# Patient Record
Sex: MALE | Race: WHITE | NOT HISPANIC OR LATINO | Employment: FULL TIME | URBAN - METROPOLITAN AREA
[De-identification: names, ages, dates, MRNs, and addresses within clinical notes are randomized per-mention and may not be internally consistent; named-entity substitution may affect disease eponyms.]

---

## 2024-03-11 DIAGNOSIS — Z11.59 NEED FOR HEPATITIS C SCREENING TEST: ICD-10-CM

## 2024-03-11 DIAGNOSIS — Z11.4 ENCOUNTER FOR SCREENING FOR HIV: ICD-10-CM

## 2024-03-11 DIAGNOSIS — Z00.00 ANNUAL PHYSICAL EXAM: ICD-10-CM

## 2024-03-11 DIAGNOSIS — Z12.5 PROSTATE CANCER SCREENING: ICD-10-CM

## 2024-03-15 ENCOUNTER — LAB (OUTPATIENT)
Dept: LAB | Facility: LAB | Age: 44
End: 2024-03-15
Payer: COMMERCIAL

## 2024-03-15 DIAGNOSIS — Z00.00 ANNUAL PHYSICAL EXAM: ICD-10-CM

## 2024-03-15 DIAGNOSIS — Z12.5 PROSTATE CANCER SCREENING: ICD-10-CM

## 2024-03-15 DIAGNOSIS — Z11.4 ENCOUNTER FOR SCREENING FOR HIV: ICD-10-CM

## 2024-03-15 DIAGNOSIS — Z11.59 NEED FOR HEPATITIS C SCREENING TEST: ICD-10-CM

## 2024-03-15 LAB
ALBUMIN SERPL BCP-MCNC: 4.2 G/DL (ref 3.4–5)
ALP SERPL-CCNC: 63 U/L (ref 33–120)
ALT SERPL W P-5'-P-CCNC: 38 U/L (ref 10–52)
ANION GAP SERPL CALC-SCNC: 11 MMOL/L (ref 10–20)
AST SERPL W P-5'-P-CCNC: 23 U/L (ref 9–39)
BASOPHILS # BLD AUTO: 0.04 X10*3/UL (ref 0–0.1)
BASOPHILS NFR BLD AUTO: 0.7 %
BILIRUB SERPL-MCNC: 0.7 MG/DL (ref 0–1.2)
BUN SERPL-MCNC: 13 MG/DL (ref 6–23)
CALCIUM SERPL-MCNC: 9.3 MG/DL (ref 8.6–10.3)
CHLORIDE SERPL-SCNC: 105 MMOL/L (ref 98–107)
CHOLEST SERPL-MCNC: 185 MG/DL (ref 0–199)
CHOLESTEROL/HDL RATIO: 5.2
CO2 SERPL-SCNC: 28 MMOL/L (ref 21–32)
CREAT SERPL-MCNC: 0.94 MG/DL (ref 0.5–1.3)
EGFRCR SERPLBLD CKD-EPI 2021: >90 ML/MIN/1.73M*2
EOSINOPHIL # BLD AUTO: 0.1 X10*3/UL (ref 0–0.7)
EOSINOPHIL NFR BLD AUTO: 1.6 %
ERYTHROCYTE [DISTWIDTH] IN BLOOD BY AUTOMATED COUNT: 13.1 % (ref 11.5–14.5)
GLUCOSE SERPL-MCNC: 82 MG/DL (ref 74–99)
HCT VFR BLD AUTO: 48 % (ref 41–52)
HCV AB SER QL: NONREACTIVE
HDLC SERPL-MCNC: 35.7 MG/DL
HGB BLD-MCNC: 15.1 G/DL (ref 13.5–17.5)
HIV 1+2 AB+HIV1 P24 AG SERPL QL IA: NONREACTIVE
IMM GRANULOCYTES # BLD AUTO: 0.02 X10*3/UL (ref 0–0.7)
IMM GRANULOCYTES NFR BLD AUTO: 0.3 % (ref 0–0.9)
LDLC SERPL CALC-MCNC: 121 MG/DL
LYMPHOCYTES # BLD AUTO: 1.92 X10*3/UL (ref 1.2–4.8)
LYMPHOCYTES NFR BLD AUTO: 31.5 %
MCH RBC QN AUTO: 27.5 PG (ref 26–34)
MCHC RBC AUTO-ENTMCNC: 31.5 G/DL (ref 32–36)
MCV RBC AUTO: 87 FL (ref 80–100)
MONOCYTES # BLD AUTO: 0.52 X10*3/UL (ref 0.1–1)
MONOCYTES NFR BLD AUTO: 8.5 %
NEUTROPHILS # BLD AUTO: 3.49 X10*3/UL (ref 1.2–7.7)
NEUTROPHILS NFR BLD AUTO: 57.4 %
NON HDL CHOLESTEROL: 149 MG/DL (ref 0–149)
NRBC BLD-RTO: 0 /100 WBCS (ref 0–0)
PLATELET # BLD AUTO: 285 X10*3/UL (ref 150–450)
POTASSIUM SERPL-SCNC: 3.8 MMOL/L (ref 3.5–5.3)
PROT SERPL-MCNC: 7 G/DL (ref 6.4–8.2)
PSA SERPL-MCNC: 0.86 NG/ML
RBC # BLD AUTO: 5.49 X10*6/UL (ref 4.5–5.9)
SODIUM SERPL-SCNC: 140 MMOL/L (ref 136–145)
TRIGL SERPL-MCNC: 143 MG/DL (ref 0–149)
TSH SERPL-ACNC: 2.34 MIU/L (ref 0.44–3.98)
VLDL: 29 MG/DL (ref 0–40)
WBC # BLD AUTO: 6.1 X10*3/UL (ref 4.4–11.3)

## 2024-03-15 PROCEDURE — 36415 COLL VENOUS BLD VENIPUNCTURE: CPT

## 2024-03-15 PROCEDURE — 84153 ASSAY OF PSA TOTAL: CPT

## 2024-03-15 PROCEDURE — 85025 COMPLETE CBC W/AUTO DIFF WBC: CPT

## 2024-03-15 PROCEDURE — 87389 HIV-1 AG W/HIV-1&-2 AB AG IA: CPT

## 2024-03-15 PROCEDURE — 80061 LIPID PANEL: CPT

## 2024-03-15 PROCEDURE — 84443 ASSAY THYROID STIM HORMONE: CPT

## 2024-03-15 PROCEDURE — 86803 HEPATITIS C AB TEST: CPT

## 2024-03-15 PROCEDURE — 80053 COMPREHEN METABOLIC PANEL: CPT

## 2024-03-17 NOTE — RESULT ENCOUNTER NOTE
Will review in office on 3/18  Chol 185, 35, 121, 143 previously 246, 36, 164, 229 so there is a clear improvement  Hepatitis C, HIV, CBC, lipid panel, thyroid, prostate cancer, thyroid stable across-the-board  Sugar, kidney, liver, electrolytes normal

## 2024-03-18 ENCOUNTER — OFFICE VISIT (OUTPATIENT)
Dept: PRIMARY CARE | Facility: CLINIC | Age: 44
End: 2024-03-18
Payer: COMMERCIAL

## 2024-03-18 VITALS
HEIGHT: 78 IN | SYSTOLIC BLOOD PRESSURE: 128 MMHG | BODY MASS INDEX: 31.35 KG/M2 | WEIGHT: 271 LBS | HEART RATE: 54 BPM | DIASTOLIC BLOOD PRESSURE: 80 MMHG

## 2024-03-18 DIAGNOSIS — R00.1 SINUS BRADYCARDIA: ICD-10-CM

## 2024-03-18 DIAGNOSIS — M77.01 MEDIAL EPICONDYLITIS OF RIGHT ELBOW: ICD-10-CM

## 2024-03-18 DIAGNOSIS — Z00.00 ANNUAL PHYSICAL EXAM: Primary | ICD-10-CM

## 2024-03-18 DIAGNOSIS — E78.2 MIXED HYPERLIPIDEMIA: ICD-10-CM

## 2024-03-18 DIAGNOSIS — M75.81 TENDINITIS OF RIGHT PECTORALIS MAJOR: ICD-10-CM

## 2024-03-18 PROBLEM — M79.673 PAIN OF FOOT: Status: ACTIVE | Noted: 2024-03-18

## 2024-03-18 PROBLEM — J34.2 NASAL SEPTAL DEVIATION: Status: ACTIVE | Noted: 2024-03-18

## 2024-03-18 PROBLEM — J30.2 SEASONAL ALLERGIES: Status: ACTIVE | Noted: 2024-03-18

## 2024-03-18 PROBLEM — M25.522 LEFT ELBOW PAIN: Status: RESOLVED | Noted: 2024-03-18 | Resolved: 2024-03-18

## 2024-03-18 PROBLEM — G56.22 CUBITAL TUNNEL SYNDROME ON LEFT: Status: ACTIVE | Noted: 2024-03-18

## 2024-03-18 PROBLEM — M25.522 LEFT ELBOW PAIN: Status: ACTIVE | Noted: 2024-03-18

## 2024-03-18 PROBLEM — M72.2 PLANTAR FASCIITIS: Status: ACTIVE | Noted: 2024-03-18

## 2024-03-18 PROCEDURE — 1036F TOBACCO NON-USER: CPT | Performed by: FAMILY MEDICINE

## 2024-03-18 PROCEDURE — 93000 ELECTROCARDIOGRAM COMPLETE: CPT | Performed by: FAMILY MEDICINE

## 2024-03-18 PROCEDURE — 99396 PREV VISIT EST AGE 40-64: CPT | Performed by: FAMILY MEDICINE

## 2024-03-18 PROCEDURE — 99214 OFFICE O/P EST MOD 30 MIN: CPT | Performed by: FAMILY MEDICINE

## 2024-03-18 RX ORDER — LORATADINE 10 MG/1
TABLET ORAL
COMMUNITY

## 2024-03-18 RX ORDER — ROSUVASTATIN CALCIUM 10 MG/1
10 TABLET, COATED ORAL DAILY
Qty: 90 TABLET | Refills: 1 | Status: SHIPPED | OUTPATIENT
Start: 2024-03-18 | End: 2024-09-14

## 2024-03-18 RX ORDER — ROSUVASTATIN CALCIUM 10 MG/1
10 TABLET, COATED ORAL DAILY
COMMUNITY
End: 2024-03-18 | Stop reason: SDUPTHER

## 2024-03-18 ASSESSMENT — PROMIS GLOBAL HEALTH SCALE
CARRYOUT_PHYSICAL_ACTIVITIES: COMPLETELY
RATE_SOCIAL_SATISFACTION: FAIR
RATE_MENTAL_HEALTH: GOOD
RATE_AVERAGE_PAIN: 3
RATE_PHYSICAL_HEALTH: FAIR
RATE_QUALITY_OF_LIFE: VERY GOOD
EMOTIONAL_PROBLEMS: RARELY
RATE_GENERAL_HEALTH: GOOD
CARRYOUT_SOCIAL_ACTIVITIES: VERY GOOD
RATE_AVERAGE_FATIGUE: MILD

## 2024-03-18 ASSESSMENT — PATIENT HEALTH QUESTIONNAIRE - PHQ9
SUM OF ALL RESPONSES TO PHQ9 QUESTIONS 1 AND 2: 0
1. LITTLE INTEREST OR PLEASURE IN DOING THINGS: NOT AT ALL
2. FEELING DOWN, DEPRESSED OR HOPELESS: NOT AT ALL

## 2024-03-18 NOTE — ASSESSMENT & PLAN NOTE
I suggested a comprehensive team approach that includes physical therapy to further evaluate and treat  OTC supportive care such as Advil or Aleve as warranted and necessary    Per your request, we will help arrange for an orthopedic consult

## 2024-03-18 NOTE — ASSESSMENT & PLAN NOTE
Complete history and physical examination was performed  EKG reveals sinus bradycardia without acute changes  Blood work was reviewed in the office

## 2024-05-23 ENCOUNTER — OFFICE VISIT (OUTPATIENT)
Dept: ORTHOPEDIC SURGERY | Facility: HOSPITAL | Age: 44
End: 2024-05-23
Payer: COMMERCIAL

## 2024-05-23 ENCOUNTER — HOSPITAL ENCOUNTER (OUTPATIENT)
Dept: RADIOLOGY | Facility: HOSPITAL | Age: 44
Discharge: HOME | End: 2024-05-23
Payer: COMMERCIAL

## 2024-05-23 ENCOUNTER — HOSPITAL ENCOUNTER (OUTPATIENT)
Dept: RADIOLOGY | Facility: EXTERNAL LOCATION | Age: 44
Discharge: HOME | End: 2024-05-23

## 2024-05-23 VITALS — HEIGHT: 78 IN | WEIGHT: 265 LBS | BODY MASS INDEX: 30.66 KG/M2

## 2024-05-23 DIAGNOSIS — G56.21 ULNAR NEURITIS, RIGHT: ICD-10-CM

## 2024-05-23 DIAGNOSIS — G56.21 CUBITAL TUNNEL SYNDROME ON RIGHT: Primary | ICD-10-CM

## 2024-05-23 DIAGNOSIS — M77.01 MEDIAL EPICONDYLITIS OF RIGHT ELBOW: ICD-10-CM

## 2024-05-23 PROCEDURE — 76942 ECHO GUIDE FOR BIOPSY: CPT | Performed by: FAMILY MEDICINE

## 2024-05-23 PROCEDURE — 73080 X-RAY EXAM OF ELBOW: CPT | Mod: RIGHT SIDE | Performed by: RADIOLOGY

## 2024-05-23 PROCEDURE — 73080 X-RAY EXAM OF ELBOW: CPT | Mod: RT

## 2024-05-23 PROCEDURE — 64450 NJX AA&/STRD OTHER PN/BRANCH: CPT | Performed by: FAMILY MEDICINE

## 2024-05-23 PROCEDURE — 99214 OFFICE O/P EST MOD 30 MIN: CPT | Performed by: FAMILY MEDICINE

## 2024-05-23 PROCEDURE — 1036F TOBACCO NON-USER: CPT | Performed by: FAMILY MEDICINE

## 2024-05-23 RX ORDER — TRIAMCINOLONE ACETONIDE 40 MG/ML
20 INJECTION, SUSPENSION INTRA-ARTICULAR; INTRAMUSCULAR ONCE
Status: SHIPPED | OUTPATIENT
Start: 2024-05-23

## 2024-05-23 RX ORDER — LIDOCAINE HYDROCHLORIDE 10 MG/ML
0.5 INJECTION INFILTRATION; PERINEURAL ONCE
Status: SHIPPED | OUTPATIENT
Start: 2024-05-23

## 2024-05-23 ASSESSMENT — PAIN DESCRIPTION - DESCRIPTORS: DESCRIPTORS: TINGLING;NUMBNESS

## 2024-05-23 ASSESSMENT — PAIN SCALES - GENERAL: PAINLEVEL_OUTOF10: 4

## 2024-05-23 ASSESSMENT — PAIN - FUNCTIONAL ASSESSMENT: PAIN_FUNCTIONAL_ASSESSMENT: 0-10

## 2024-05-23 NOTE — PROGRESS NOTES
"** Please excuse any errors in grammar or translation related to this dictation. Voice recognition software was utilized to prepare this document. **    Assessment & Plan:  Dx: Cubital Tunnel Syndrome    Discussed with patient that current presentation is most aligned with the above diagnosis along with its mechanism, management, and anticipated outcome.  Also discussed there may be component of cervical radiculopathy given symptoms intermittently occurring in upper arm/shoulder area.   Discussed options for management of this condition and made shared decision making for the selected treatment listed below.    - Today's treatment plan: US-guided perineural injection of the ulnar nerve at cubital tunnel.  Explained to patient the following the injection should have sensation of numbness throughout the ulnar side of his forearm and hand for the next 2 to 3 hours.  After that time we will regain sensation.  The effect of the steroid medication typically takes 3 to 5 days to occur with the hopes of reducing his associated pain and paresthesias.  Referral to OT.   - Follow-up: Discussed with patient that if he does not have resolution of his symptoms with the above measures, he would be served best by seeing hand and upper extremity surgeon.  Contact information for Dr. Aguilar provided.    All questions answered and patient is agreeable to plan of care.  Copy of these referral sent to patient's PCP Dr. Larson.      Chief complaint:  Right arm numbness    HPI:  42 y/o patient presents with right elbow pain and arm numbness.  This complaint has been ongoing for over 1 year.  No mechanism of injury reported at onset. Symptoms have progressively worsened with time.  It is associated with paresthesia and  numbness down his forearm starting at medial elbow into his hand. Pain felt more in digits 1-3 with numbness in digits 2-4.  Describes as arm \"falling asleep\".   He reports intermittent elbow locking as well.  Symptoms are " aggravated by golfing and use of the R arm. No neck pain. Also had intermittent R shoulder pain and reportedly could not lift this right arm past 90 degrees of flexion. This shoulder issue has resolved per the patient. To date, patient has tried a variety of treatments to include icing and a Band-it brace with little sustained effect. Previously saw Dr. Larson for this with last visit being 3/18/24. Has also seen chiropractic practitioner in the past.  Denies previous surgery to this site.     Of note, patient golfs once weekly.     Patient Active Problem List   Diagnosis    Mixed hyperlipidemia    Cubital tunnel syndrome on left    Pain of foot    Plantar fasciitis    Nasal septal deviation    Seasonal allergies    Sinus bradycardia    Medial epicondylitis of right elbow    Tendinitis of right pectoralis major    Annual physical exam     Past Surgical History:   Procedure Laterality Date    HERNIA REPAIR  01/03/2014    Hernia Repair    NASAL SEPTUM SURGERY  02/29/2016    Nasal Septal Deviation Repair    SEPTOPLASTY  02/26/2014    Septoplasty    SHOULDER SURGERY  01/03/2014    Shoulder Surgery    VASECTOMY  02/13/2017    Surgery Vas Deferens Vasectomy     Current Outpatient Medications on File Prior to Visit   Medication Sig Dispense Refill    loratadine (Claritin) 10 mg tablet Take by mouth.      rosuvastatin (Crestor) 10 mg tablet Take 1 tablet (10 mg) by mouth once daily. 90 tablet 1     No current facility-administered medications on file prior to visit.     Exam:  Cervical Spine Exam:  Full AROM in flexion, extension, rotation, and sidebending.  No step-offs. No midline tenderness.   No tenderness or spasticity of paraspinal musculature.  C5: SILT, [ 5]/5 shoulder abduction and biceps flexion  C6: decreased sensation to light touch, [5]/5 wrist extension  C7: decreased sensation, [5]/5 tricep extension and wrist flexion  C8: SILT, [5]/5 finger flexion  T1: SILT. [5]/5 finger abduction  [ + ] Spurling´s  radiating pain throughout arm and forearm    [R] Shoulder Exam:  No swelling, warmth or ecchymosis of shoulder present.   AROM: Flexion [170 ] [id number]; Abduction [170] [id number]; IR [70] of 70deg at 90deg; ER [90] of 90deg at 90deg; Horizontal Adduction [130] [id number]  [5]/5 strength in ER, IR, abduction, flexion   - Hawkin, -Crank, -empty can, -Raya's    [R] Elbow Exam:  No swelling, erythema or ecchymosis present.  TTP over ulnar nerve near medial epicondyle  Flexion to [150] of 150deg, Extension to [0] of 0deg, Pronate/Supinate [80]/[80] of 80/80deg  [5]/5 strength elbow flexion and extension, wrist extension and flexion, finger spread  SILT  [+] Tinel´s at Cubital tunnel with shooting numbness into all 4 fingers and thumb  [No] pain with resisted wrist flexion  [No] pain with resisted wrist extension  [Negative] Modified milking maneuver    [R] Hand Exam:  No thenar, hypothenar, or intraosseous atrophy.  No erythema, warmth or swelling of hand or digits.  Normal finger cascade and composite fist.   SILT r/u/m distribution. Normal thumbs up, finger spread, and ok sign.  Capillary refill <2 sec, 2+ radial and ulnar pulse.  - Durkhans Compression, - Tinels at the median and ulnar nerves       Results:  X-ray right elbow obtained 5/23/2024 personally interpreted as no acute fractures with normal alignment.  There appears to be chronic avulsion fragment from coronoid process.  Small enthesophyte adjacent to medial epicondyle.    Lab Results   Component Value Date    CREATININE 0.94 03/15/2024    EGFR >90 03/15/2024      Procedure:   Patient ID: Emory Dave is a 43 y.o. male.    Nerve Block    Date/Time: 5/23/2024 12:14 PM    Performed by: Prabhu Adair DO  Authorized by: Prabhu Adair DO    Consent:     Consent obtained:  Verbal    Consent given by:  Patient    Risks, benefits, and alternatives were discussed: yes      Risks discussed:  Infection, nerve damage, swelling, bleeding, pain and unsuccessful  block    Alternatives discussed:  No treatment and referral  Universal protocol:     Procedure explained and questions answered to patient or proxy's satisfaction: yes      Site/side marked: yes      Immediately prior to procedure, a time out was called: yes      Patient identity confirmed:  Verbally with patient  Indications:     Indications:  Pain relief  Location:     Body area:  Upper extremity    Upper extremity nerve blocked: ulnar nerve.    Laterality:  Right  Pre-procedure details:     Skin preparation:  Chlorhexidine    Preparation: Patient was prepped and draped in usual sterile fashion    Skin anesthesia:     Skin anesthesia method:  None  Procedure details:     Block needle gauge:  25 G    Guidance: ultrasound      Anesthetic injected:  Lidocaine 1% w/o epi    Steroid injected:  Triamcinolone    Additive injected:  None    Paresthesia:  None  Post-procedure details:     Dressing:  Sterile dressing    Procedure completion:  Tolerated  Comments:      Right ulnar nerve identified between olecranon and medial epicondyle using ultrasound.  A 25-gauge needle was inserted adjacent to the nerve.  A solution containing 0.5 mL of lidocaine 1% without epinephrine and 0.5 mL of triamcinolone 40 mg/mL were then injected.  Needle was removed.  Bandage placed.  Patient reports numbness throughout ulnar side of forearm and fifth digit following.     - Hypoechoic liver lesions and hepatic steatosis on RUQ US  - Hepatitis panel, HIV (-)  - CT A/P consistent with hepatic steatosis

## 2024-05-23 NOTE — PROGRESS NOTES
** Please excuse any errors in grammar or translation related to this dictation. Voice recognition software was utilized to prepare this document. **    Assessment & Plan:    Dx:    Discussed with patient that current presentation is most aligned with the above diagnosis along with its mechanism, management, and anticipated outcome. Briefly discussed differential diagnosis to include:    Discussed options for management of this condition and made shared decision making for the selected treatment listed below.      - Today's treatment plan: Steroid injection. / Meloxicam. / Diclofenac gel. / Physical therapy. / Home rehab exercise program. / MRI ordered to assess for  ./Referral to /  brace.  - Ongoing treatment: Activity modifications for daily life as well as exercise. / Weight loss w/ referral to nutrition/weight loss program.  - Work/exercise limits: No specific restrictions. All activities as tolerated.   - Follow-up:    All questions answered and patient is agreeable to plan of care.      Chief complaint:    HPI:  CHRONIC: Xx y/o patient, hx of XXX, presents with [ ].  This complaint has been ongoing for XX years.  No mechanism of injury reported at onset. Symptoms have progressively worsened with time.  Pain is most prominent at [ ] though it is also present at [ ].  It is associated with sensation of stiffness, loss of joint motion, crackling/popping sensation, XXX.  No reported sensory changes or weakness, joint locking, or feeling of instability.  Symptoms are aggravated by [ ]. To date, patient has tried a variety of treatments to include [ ] with little sustained effect. Previously saw Dr. GREEN for this with last visit being XX. Last steroid injection completed [ ]. Currently using [ ] for symptom control. Denies previous surgery to this site.       ACUTE: Xx y/o, hx of XXX, presents with [ ].  Onset of this injury was xx days/weeks ago.  Mechanism of injury reported to be [ ]. Following the injury patient  was evaluated at urgent care/ER/PCP's office, had xrays completed, and treatment initiated with [ ].  Since onset of injury, symptoms have worsened/improved/stayed the same.  The injury is aggravated by [ ].  Patient presents today for evaluation and further orthopedic management.  Denies previous surgery to this site.     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     Patient Active Problem List   Diagnosis    Mixed hyperlipidemia    Cubital tunnel syndrome on left    Pain of foot    Plantar fasciitis    Nasal septal deviation    Seasonal allergies    Sinus bradycardia    Medial epicondylitis of right elbow    Tendinitis of right pectoralis major    Annual physical exam     Past Surgical History:   Procedure Laterality Date    HERNIA REPAIR  01/03/2014    Hernia Repair    NASAL SEPTUM SURGERY  02/29/2016    Nasal Septal Deviation Repair    SEPTOPLASTY  02/26/2014    Septoplasty    SHOULDER SURGERY  01/03/2014    Shoulder Surgery    VASECTOMY  02/13/2017    Surgery Vas Deferens Vasectomy     Current Outpatient Medications on File Prior to Visit   Medication Sig Dispense Refill    loratadine (Claritin) 10 mg tablet Take by mouth.      rosuvastatin (Crestor) 10 mg tablet Take 1 tablet (10 mg) by mouth once daily. 90 tablet 1     No current facility-administered medications on file prior to visit.         Exam:    Cervical Spine Exam:  Full AROM in flexion, extension, rotation, and sidebending.  No step-offs. No midline tenderness.   Tenderness and spasticity of paraspinal musculature.  C5: SILT, [ 5]/5 shoulder abduction and biceps flexion, 2+ DTR  C6: decreased sensation to light touch, [5]/5 wrist extension, 2+ DTR  C7: decreased sensation, [5]/5 tricep extension and wrist flexion, 2+ DTR  C8: SILT, [5]/5 finger flexion  T1: SILT. [5]/5 finger abduction  [ + ] Spurling´s    [R] Shoulder Exam:  No swelling, warmth or ecchymosis of shoulder present.   AROM: Flexion [170 ] [id number]; Abduction [170] [id number]; IR [70] of  70deg at 90deg; ER [90] of 90deg at 90deg; Horizontal Adduction [130] [id number]  TTP over  LHB tendon  [5]/5 strength in ER, IR, abduction, flexion   SILT in all dermatomal distributions C8-T1, reduced sensation to ligt touch in C6 and C7 distributions    LABRUM: [+] O´dianelys´s,  [-] Biceps load test  IMPINGEMENT:  [-] Hawkin´s, [-] Neer´s, [-] painful arc  ROTATOR CUFF: [-] Limb drop, [-] lag signs, [-] Empty Can (SS),   BICEPS: [-] Speed´s, [-] Yergason´s    [R] Elbow Exam:  No swelling, erythema or ecchymosis present.  TTP over ulnar nerve near medial epicondyle  Flexion to [150] of 150deg, Extension to [0] of 0deg, Pronate/Supinate [80]/[80] of 80/80deg  [5]/5 strength elbow flexion and extension, wrist extension and flexion, finger spread  SILT  [+] Tinel´s at Cubital tunnel  [No] pain with resisted wrist flexion  [No] pain with resisted wrist extension  [Negative] Modified milking maneuver  [Negative] Hook test     Results:    I personally reviewed R elbow X rays taken 5/23/24 which demonstrate     Lab Results   Component Value Date    CREATININE 0.94 03/15/2024    EGFR >90 03/15/2024          Procedure:

## 2024-05-23 NOTE — LETTER
May 23, 2024     Jaswinder Larson DO  [address]  [facility], [address]  [facility] [address]    Patient: Emory Dave   YOB: 1980   Date of Visit: 5/23/2024       Dear Dr. Jaswinder Larson, :    Thank you for referring Emory Dave to me for evaluation. Below are my notes for this consultation.  If you have questions, please do not hesitate to call me. I look forward to following your patient along with you.       Sincerely,     Prabhu Adair DO      CC: No Recipients  ______________________________________________________________________________________    ** Please excuse any errors in grammar or translation related to this dictation. Voice recognition software was utilized to prepare this document. **    Assessment & Plan:  Dx: Cubital Tunnel Syndrome    Discussed with patient that current presentation is most aligned with the above diagnosis along with its mechanism, management, and anticipated outcome.  Also discussed there may be component of cervical radiculopathy given symptoms intermittently occurring in upper arm/shoulder area.   Discussed options for management of this condition and made shared decision making for the selected treatment listed below.    - Today's treatment plan: US-guided perineural injection of the ulnar nerve at cubital tunnel.  Explained to patient the following the injection should have sensation of numbness throughout the ulnar side of his forearm and hand for the next 2 to 3 hours.  After that time we will regain sensation.  The effect of the steroid medication typically takes 3 to 5 days to occur with the hopes of reducing his associated pain and paresthesias.  Referral to OT.   - Follow-up: Discussed with patient that if he does not have resolution of his symptoms with the above measures, he would be served best by seeing hand and upper extremity surgeon.  Contact information for Dr. Aguilar provided.    All questions answered and patient is  "agreeable to plan of care.  Copy of these referral sent to patient's PCP Dr. Larson.      Chief complaint:  Right arm numbness    HPI:  42 y/o patient presents with right elbow pain and arm numbness.  This complaint has been ongoing for over 1 year.  No mechanism of injury reported at onset. Symptoms have progressively worsened with time.  It is associated with paresthesia and  numbness down his forearm starting at medial elbow into his hand. Pain felt more in digits 1-3 with numbness in digits 2-4.  Describes as arm \"falling asleep\".   He reports intermittent elbow locking as well.  Symptoms are aggravated by golfing and use of the R arm. No neck pain. Also had intermittent R shoulder pain and reportedly could not lift this right arm past 90 degrees of flexion. This shoulder issue has resolved per the patient. To date, patient has tried a variety of treatments to include icing and a Band-it brace with little sustained effect. Previously saw Dr. Larson for this with last visit being 3/18/24. Has also seen chiropractic practitioner in the past.  Denies previous surgery to this site.     Of note, patient golfs once weekly.     Patient Active Problem List   Diagnosis   • Mixed hyperlipidemia   • Cubital tunnel syndrome on left   • Pain of foot   • Plantar fasciitis   • Nasal septal deviation   • Seasonal allergies   • Sinus bradycardia   • Medial epicondylitis of right elbow   • Tendinitis of right pectoralis major   • Annual physical exam     Past Surgical History:   Procedure Laterality Date   • HERNIA REPAIR  01/03/2014    Hernia Repair   • NASAL SEPTUM SURGERY  02/29/2016    Nasal Septal Deviation Repair   • SEPTOPLASTY  02/26/2014    Septoplasty   • SHOULDER SURGERY  01/03/2014    Shoulder Surgery   • VASECTOMY  02/13/2017    Surgery Vas Deferens Vasectomy     Current Outpatient Medications on File Prior to Visit   Medication Sig Dispense Refill   • loratadine (Claritin) 10 mg tablet Take by mouth.     • " rosuvastatin (Crestor) 10 mg tablet Take 1 tablet (10 mg) by mouth once daily. 90 tablet 1     No current facility-administered medications on file prior to visit.     Exam:  Cervical Spine Exam:  Full AROM in flexion, extension, rotation, and sidebending.  No step-offs. No midline tenderness.   No tenderness or spasticity of paraspinal musculature.  C5: SILT, [ 5]/5 shoulder abduction and biceps flexion  C6: decreased sensation to light touch, [5]/5 wrist extension  C7: decreased sensation, [5]/5 tricep extension and wrist flexion  C8: SILT, [5]/5 finger flexion  T1: SILT. [5]/5 finger abduction  [ + ] Spurling´s radiating pain throughout arm and forearm    [R] Shoulder Exam:  No swelling, warmth or ecchymosis of shoulder present.   AROM: Flexion [170 ] [id number]; Abduction [170] [id number]; IR [70] of 70deg at 90deg; ER [90] of 90deg at 90deg; Horizontal Adduction [130] [id number]  [5]/5 strength in ER, IR, abduction, flexion   - Hawkin, -Crank, -empty can, -Raya's    [R] Elbow Exam:  No swelling, erythema or ecchymosis present.  TTP over ulnar nerve near medial epicondyle  Flexion to [150] of 150deg, Extension to [0] of 0deg, Pronate/Supinate [80]/[80] of 80/80deg  [5]/5 strength elbow flexion and extension, wrist extension and flexion, finger spread  SILT  [+] Tinel´s at Cubital tunnel with shooting numbness into all 4 fingers and thumb  [No] pain with resisted wrist flexion  [No] pain with resisted wrist extension  [Negative] Modified milking maneuver    [R] Hand Exam:  No thenar, hypothenar, or intraosseous atrophy.  No erythema, warmth or swelling of hand or digits.  Normal finger cascade and composite fist.   SILT r/u/m distribution. Normal thumbs up, finger spread, and ok sign.  Capillary refill <2 sec, 2+ radial and ulnar pulse.  - Durkhans Compression, - Tinels at the median and ulnar nerves       Results:  X-ray right elbow obtained 5/23/2024 personally interpreted as no acute fractures with normal  alignment.  There appears to be chronic avulsion fragment from coronoid process.  Small enthesophyte adjacent to medial epicondyle.    Lab Results   Component Value Date    CREATININE 0.94 03/15/2024    EGFR >90 03/15/2024      Procedure:   Patient ID: Emory Dave is a 43 y.o. male.    Nerve Block    Date/Time: 5/23/2024 12:14 PM    Performed by: Prabhu Adair DO  Authorized by: Prabhu Adair DO    Consent:     Consent obtained:  Verbal    Consent given by:  Patient    Risks, benefits, and alternatives were discussed: yes      Risks discussed:  Infection, nerve damage, swelling, bleeding, pain and unsuccessful block    Alternatives discussed:  No treatment and referral  Universal protocol:     Procedure explained and questions answered to patient or proxy's satisfaction: yes      Site/side marked: yes      Immediately prior to procedure, a time out was called: yes      Patient identity confirmed:  Verbally with patient  Indications:     Indications:  Pain relief  Location:     Body area:  Upper extremity    Upper extremity nerve blocked: ulnar nerve.    Laterality:  Right  Pre-procedure details:     Skin preparation:  Chlorhexidine    Preparation: Patient was prepped and draped in usual sterile fashion    Skin anesthesia:     Skin anesthesia method:  None  Procedure details:     Block needle gauge:  25 G    Guidance: ultrasound      Anesthetic injected:  Lidocaine 1% w/o epi    Steroid injected:  Triamcinolone    Additive injected:  None    Paresthesia:  None  Post-procedure details:     Dressing:  Sterile dressing    Procedure completion:  Tolerated  Comments:      Right ulnar nerve identified between olecranon and medial epicondyle using ultrasound.  A 25-gauge needle was inserted adjacent to the nerve.  A solution containing 0.5 mL of lidocaine 1% without epinephrine and 0.5 mL of triamcinolone 40 mg/mL were then injected.  Needle was removed.  Bandage placed.  Patient reports numbness throughout ulnar side  of forearm and fifth digit following.

## 2024-05-24 NOTE — PROGRESS NOTES
Subjective   Patient ID: Emory Dave is a 43 y.o. male who presents for Annual Exam.    Past Medical, Surgical, and Family History reviewed and updated in chart.    Reviewed all medications by prescribing practitioner or clinical pharmacist (such as prescriptions, OTCs, herbal therapies and supplements) and documented in the medical record.    HPI  1.  Physical exam.  Colonoscopy: no family history of colon cancer  Immunizations: Tdap 2019  Social: denies tobacco use, social alcohol use    2.  Hyperlipidemia.  Controlled on Rosuvastatin 10mg every day  Denies ACS symptoms   Denies medication side effects including myalgias    3.  Right shoulder pain.  Symptoms started about four months ago  Describes the pain as aggravating and achy  Symptoms are worse then patient rolls on his right shoulder during sleep  Pain is anterior medial shoulder below AC joint  Denies any instability, injury or trauma to area  Patient states he recently began working out again, since switching jobs    4.  Right elbow pain.  History of medial epicondylitis  Has followed with ortho in the past  Symptoms have returned since the completion of golf season  Pain is worse with resisted pronation    Review of Systems  All pertinent positive symptoms are included in the history of present illness.    All other systems have been reviewed and are negative and noncontributory to this patient's current ailments.    Past Medical History:   Diagnosis Date    Personal history of other diseases of the circulatory system     History of cardiac murmur     Past Surgical History:   Procedure Laterality Date    HERNIA REPAIR  01/03/2014    Hernia Repair    NASAL SEPTUM SURGERY  02/29/2016    Nasal Septal Deviation Repair    SEPTOPLASTY  02/26/2014    Septoplasty    SHOULDER SURGERY  01/03/2014    Shoulder Surgery    VASECTOMY  02/13/2017    Surgery Vas Deferens Vasectomy     Social History     Tobacco Use    Smoking status: Never    Smokeless tobacco: Never  "    No family history on file.  Immunization History   Administered Date(s) Administered    DTaP vaccine, pediatric  (INFANRIX) 06/10/2019    Flu vaccine (IIV4), preservative free *Check age/dose* 11/15/2018    Pfizer Purple Cap SARS-CoV-2 09/10/2021    Tdap vaccine, age 7 year and older (BOOSTRIX, ADACEL) 12/19/2012, 06/10/2019     Current Outpatient Medications   Medication Instructions    loratadine (Claritin) 10 mg tablet oral    rosuvastatin (CRESTOR) 10 mg, oral, Daily     Allergies   Allergen Reactions    Grass Pollen Itching, Rash and Cough       Objective   Vitals:    03/18/24 0936   BP: 128/80   Pulse: 54   Weight: 123 kg (271 lb)   Height: 2.057 m (6' 9\")     Body mass index is 29.04 kg/m².    BP Readings from Last 3 Encounters:   03/18/24 128/80   12/06/22 120/75   04/25/22 130/80      Wt Readings from Last 3 Encounters:   03/18/24 123 kg (271 lb)   12/06/22 116 kg (256 lb)   04/25/22 119 kg (262 lb)        Lab on 03/15/2024   Component Date Value    Cholesterol 03/15/2024 185     HDL-Cholesterol 03/15/2024 35.7     Cholesterol/HDL Ratio 03/15/2024 5.2     LDL Calculated 03/15/2024 121 (H)     VLDL 03/15/2024 29     Triglycerides 03/15/2024 143     Non HDL Cholesterol 03/15/2024 149     Prostate Specific AG 03/15/2024 0.86     Thyroid Stimulating Horm* 03/15/2024 2.34     Glucose 03/15/2024 82     Sodium 03/15/2024 140     Potassium 03/15/2024 3.8     Chloride 03/15/2024 105     Bicarbonate 03/15/2024 28     Anion Gap 03/15/2024 11     Urea Nitrogen 03/15/2024 13     Creatinine 03/15/2024 0.94     eGFR 03/15/2024 >90     Calcium 03/15/2024 9.3     Albumin 03/15/2024 4.2     Alkaline Phosphatase 03/15/2024 63     Total Protein 03/15/2024 7.0     AST 03/15/2024 23     Bilirubin, Total 03/15/2024 0.7     ALT 03/15/2024 38     WBC 03/15/2024 6.1     nRBC 03/15/2024 0.0     RBC 03/15/2024 5.49     Hemoglobin 03/15/2024 15.1     Hematocrit 03/15/2024 48.0     MCV 03/15/2024 87     MCH 03/15/2024 27.5     MCHC " 03/15/2024 31.5 (L)     RDW 03/15/2024 13.1     Platelets 03/15/2024 285     Neutrophils % 03/15/2024 57.4     Immature Granulocytes %,* 03/15/2024 0.3     Lymphocytes % 03/15/2024 31.5     Monocytes % 03/15/2024 8.5     Eosinophils % 03/15/2024 1.6     Basophils % 03/15/2024 0.7     Neutrophils Absolute 03/15/2024 3.49     Immature Granulocytes Ab* 03/15/2024 0.02     Lymphocytes Absolute 03/15/2024 1.92     Monocytes Absolute 03/15/2024 0.52     Eosinophils Absolute 03/15/2024 0.10     Basophils Absolute 03/15/2024 0.04     HIV 1/2 Antigen/Antibody* 03/15/2024 Nonreactive     Hepatitis C AB 03/15/2024 Nonreactive      Physical Exam  CONSTITUTIONAL - well nourished, well developed, looks like stated age, in no acute distress, not ill-appearing, and not tired appearing  SKIN - normal skin color and pigmentation, normal skin turgor without rash, lesions, or nodules visualized  HEAD - no trauma, normocephalic  EYES - external exam  NECK - supple without rigidity, no neck mass was observed, no thyromegaly or thyroid nodules  CHEST - clear to auscultation, no wheezing, no crackles and no rales, good effort  CARDIAC - regular rate and regular rhythm, no skipped beats, no murmur  EXTREMITIES - no obvious or evident edema, no obvious or evident deformities  NEUROLOGICAL - normal gait, normal balance, normal motor, no ataxia, alert, oriented and no focal signs  MSK - tenderness to palpation at the Pec Major tendon attachment. Negative empty can test. No tenderness to AC joint palpation; R elbow with tenderness at medial epicondyle and tender with resisted pronation  PSYCHIATRIC - alert, pleasant and cordial, age-appropriate  IMMUNOLOGIC - no cervical lymphadenopathy    Assessment/Plan   Problem List Items Addressed This Visit       Mixed hyperlipidemia     Stable. No changes to medical management          Relevant Medications    rosuvastatin (Crestor) 10 mg tablet    Sinus bradycardia     Asymptomatic from a cardiac  standpoint, EKG without changes         Medial epicondylitis of right elbow     I suggested a comprehensive team approach that includes physical therapy to further evaluate and treat  OTC supportive care such as Advil or Aleve as warranted and necessary    Per your request, we will help arrange for an orthopedic consult         Relevant Orders    Referral to Physical Therapy    Referral to Orthopaedic Surgery    Tendinitis of right pectoralis major    Relevant Orders    Referral to Physical Therapy    Referral to Orthopaedic Surgery    Annual physical exam - Primary     Complete history and physical examination was performed  EKG reveals sinus bradycardia without acute changes  Blood work was reviewed in the office         Relevant Orders    ECG 12 lead (Clinic Performed) (Completed)      N/A

## 2025-06-25 DIAGNOSIS — E78.2 MIXED HYPERLIPIDEMIA: Primary | ICD-10-CM

## 2025-06-25 DIAGNOSIS — Z13.1 SCREENING FOR DIABETES MELLITUS: ICD-10-CM

## 2025-06-25 DIAGNOSIS — Z00.00 ANNUAL PHYSICAL EXAM: ICD-10-CM

## 2025-06-25 DIAGNOSIS — Z13.6 SCREENING FOR CARDIOVASCULAR CONDITION: ICD-10-CM

## 2025-06-25 DIAGNOSIS — Z13.29 SCREENING FOR THYROID DISORDER: ICD-10-CM

## 2025-06-25 DIAGNOSIS — Z13.0 SCREENING FOR BLOOD DISEASE: ICD-10-CM

## 2025-06-25 DIAGNOSIS — Z12.5 PROSTATE CANCER SCREENING: ICD-10-CM

## 2025-07-16 NOTE — PROGRESS NOTES
Chief Complaint  Patient ID: Emory Dave is a 44 y.o. male who presents for Annual Exam.    Past Medical, Surgical, and Family History reviewed and updated in chart.    Reviewed all medications by prescribing practitioner or clinical pharmacist (such as prescriptions, OTCs, herbal therapies and supplements) and documented in the medical record.    History of Present Illness  1. Physical Exam     - Colonoscopy: No family history of colon cancer; screening starts at 45 years of age.     - Immunizations: Up-to-date.     - Social History: Denies tobacco use; social alcohol use.     - Recent Life Changes: Went through a divorce last year, which was very difficult; changed jobs, now working at [employer] in [employer], previously employed at [employer].    2. Hyperlipidemia     - Has not taken rosuvastatin in at least the past year.     - Denies symptoms of acute coronary syndrome (ACS).    3. Erectile Dysfunction     - Engaged and experiencing difficulty maintaining an erection.     - Interested in considering Cialis for treatment.    Review of Systems  All pertinent positive symptoms are included in the history of present illness.    All other systems have been reviewed and are negative and noncontributory to this patient's current ailments.    Past Medical History  He has a past medical history of Personal history of other diseases of the circulatory system.    Surgical History  He has a past surgical history that includes Shoulder surgery (01/03/2014); Hernia repair (01/03/2014); Vasectomy (02/13/2017); Nasal septum surgery (02/29/2016); and Septoplasty (02/26/2014).     Social History  He reports that he has never smoked. He has never used smokeless tobacco. He reports current alcohol use. He reports that he does not use drugs.    Family History[1]  Medications Prior to Visit[2]    Allergies  Grass pollen    Immunization History   Administered Date(s) Administered    COVID-19, mRNA, LNP-S, PF, 30 mcg/0.3 mL dose 09/10/2021  "   DTaP vaccine, pediatric  (INFANRIX) 06/10/2019    Flu vaccine (IIV4), preservative free *Check age/dose* 11/15/2018    Tdap vaccine, age 7 year and older (BOOSTRIX, ADACEL) 12/19/2012, 06/10/2019     Objective   Visit Vitals  Pulse 52   Ht (!) 2.032 m (6' 8\")   Wt 122 kg (270 lb)   BMI 29.66 kg/m²   Smoking Status Never   BSA 2.62 m²        BP Readings from Last 3 Encounters:   03/18/24 128/80   12/06/22 120/75   04/25/22 130/80      Wt Readings from Last 3 Encounters:   07/17/25 122 kg (270 lb)   05/23/24 120 kg (265 lb)   03/18/24 123 kg (271 lb)       Relevant Results  Office Visit on 07/17/2025   Component Date Value    POC Color, Urine 07/17/2025 Yellow     POC Appearance, Urine 07/17/2025 Clear     POC Glucose, Urine 07/17/2025 NEGATIVE     POC Bilirubin, Urine 07/17/2025 NEGATIVE     POC Ketones, Urine 07/17/2025 NEGATIVE     POC Specific Gravity, Ur* 07/17/2025 1.025     POC Blood, Urine 07/17/2025 NEGATIVE     POC PH, Urine 07/17/2025 7.0     POC Protein, Urine 07/17/2025 NEGATIVE     POC Urobilinogen, Urine 07/17/2025 0.2     Poc Nitrite, Urine 07/17/2025 NEGATIVE     POC Leukocytes, Urine 07/17/2025 NEGATIVE      The 10-year ASCVD risk score (Christian DK, et al., 2019) is: 2.4%    Values used to calculate the score:      Age: 44 years      Clincally relevant sex: Male      Is Non- : No      Diabetic: No      Tobacco smoker: No      Systolic Blood Pressure: 128 mmHg      Is BP treated: No      HDL Cholesterol: 35.7 mg/dL      Total Cholesterol: 185 mg/dL    Physical Exam  CONSTITUTIONAL - well nourished, well developed, looks like stated age, in no acute distress, not ill-appearing, and not tired appearing  SKIN - normal skin color and pigmentation, normal skin turgor without rash, lesions, or nodules visualized  HEAD - no trauma, normocephalic  EYES - pupils are equal and reactive to light, extraocular muscles are intact, and normal external exam  ENT - TM's intact, no injection, " no signs of infection, uvula midline, normal tongue movement and throat normal, no exudate  NECK - supple without rigidity, no neck mass was observed, no thyromegaly or thyroid nodules  CHEST - clear to auscultation, no wheezing, no crackles and no rales, good effort  CARDIAC - bradycardic rate and regular rhythm, no skipped beats, no murmur  ABDOMEN - no organomegaly, soft, nontender, nondistended, normal bowel sounds, no guarding/rebound/rigidity, negative McBurney sign and negative Chen sign  EXTREMITIES - no obvious or evident edema, no obvious or evident deformities  NEUROLOGICAL - normal gait, normal balance, normal motor, no ataxia, DTRs equal and symmetrical; alert, oriented and no focal signs  PSYCHIATRIC - alert, pleasant and cordial, age-appropriate  IMMUNOLOGIC - no cervical lymphadenopathy    Assessment and Plan  Problem List Items Addressed This Visit       Mixed hyperlipidemia    CT cardiac score recommended after the age of 45  If cardiac score is 0, no statin therapy warranted  We will notify of test results once available         Relevant Medications    tadalafil 20 mg tablet    Other Relevant Orders    CT cardiac scoring wo IV contrast    Annual physical exam - Primary    Complete history and physical examination was performed  EKG reveals sinus bradycardia without acute changes  We will notify of test results once available         Relevant Orders    POCT UA (nonautomated) manually resulted (Completed)    ECG 12 Lead (Completed)    Erectile dysfunction    Risks, benefits, and options of treatment(s) were discussed after reviewing all current medication(s) and drug allergy(ies)  I opted for the treatment that we discussed with instructions on the medication use for your underlying medical ailment(s)         Relevant Medications    tadalafil 20 mg tablet     Other Visit Diagnoses         Colon cancer screening        Relevant Orders    Colonoscopy Screening; Average Risk Patient               [1]  No family history on file.  [2]   Outpatient Medications Prior to Visit   Medication Sig Dispense Refill    loratadine (Claritin) 10 mg tablet Take by mouth.      rosuvastatin (Crestor) 10 mg tablet Take 1 tablet (10 mg) by mouth once daily. 90 tablet 1     Facility-Administered Medications Prior to Visit   Medication Dose Route Frequency Provider Last Rate Last Admin    lidocaine (Xylocaine) 10 mg/mL (1 %) injection 5 mg  0.5 mL injection Once Prabhu Adair DO        triamcinolone acetonide (Kenalog-40) injection 20 mg  20 mg intralesional Once Prabhu Adair, DO

## 2025-07-17 ENCOUNTER — APPOINTMENT (OUTPATIENT)
Dept: PRIMARY CARE | Facility: CLINIC | Age: 45
End: 2025-07-17
Payer: COMMERCIAL

## 2025-07-17 VITALS — HEIGHT: 78 IN | BODY MASS INDEX: 31.24 KG/M2 | HEART RATE: 52 BPM | WEIGHT: 270 LBS

## 2025-07-17 DIAGNOSIS — Z12.11 COLON CANCER SCREENING: ICD-10-CM

## 2025-07-17 DIAGNOSIS — E78.2 MIXED HYPERLIPIDEMIA: ICD-10-CM

## 2025-07-17 DIAGNOSIS — N52.9 ERECTILE DYSFUNCTION, UNSPECIFIED ERECTILE DYSFUNCTION TYPE: ICD-10-CM

## 2025-07-17 DIAGNOSIS — Z00.00 ANNUAL PHYSICAL EXAM: Primary | ICD-10-CM

## 2025-07-17 LAB
POC APPEARANCE, URINE: CLEAR
POC BILIRUBIN, URINE: NEGATIVE
POC BLOOD, URINE: NEGATIVE
POC COLOR, URINE: YELLOW
POC GLUCOSE, URINE: NEGATIVE MG/DL
POC KETONES, URINE: NEGATIVE MG/DL
POC LEUKOCYTES, URINE: NEGATIVE
POC NITRITE,URINE: NEGATIVE
POC PH, URINE: 7 PH
POC PROTEIN, URINE: NEGATIVE MG/DL
POC SPECIFIC GRAVITY, URINE: 1.02
POC UROBILINOGEN, URINE: 0.2 EU/DL

## 2025-07-17 RX ORDER — TADALAFIL 20 MG/1
TABLET ORAL
Qty: 15 TABLET | Refills: 5 | Status: SHIPPED | OUTPATIENT
Start: 2025-07-17

## 2025-07-17 ASSESSMENT — PATIENT HEALTH QUESTIONNAIRE - PHQ9
2. FEELING DOWN, DEPRESSED OR HOPELESS: NOT AT ALL
1. LITTLE INTEREST OR PLEASURE IN DOING THINGS: NOT AT ALL
SUM OF ALL RESPONSES TO PHQ9 QUESTIONS 1 AND 2: 0

## 2025-07-17 NOTE — ASSESSMENT & PLAN NOTE
CT cardiac score recommended after the age of 45  If cardiac score is 0, no statin therapy warranted  We will notify of test results once available

## 2025-07-21 DIAGNOSIS — Z12.11 SPECIAL SCREENING FOR MALIGNANT NEOPLASMS, COLON: ICD-10-CM

## 2025-07-21 RX ORDER — SOD SULF/POT CHLORIDE/MAG SULF 1.479 G
TABLET ORAL
Qty: 24 TABLET | Refills: 0 | Status: SHIPPED | OUTPATIENT
Start: 2025-07-21

## 2025-07-24 LAB
ALBUMIN SERPL-MCNC: 4.3 G/DL (ref 3.6–5.1)
ALP SERPL-CCNC: 59 U/L (ref 36–130)
ALT SERPL-CCNC: 52 U/L (ref 9–46)
ANION GAP SERPL CALCULATED.4IONS-SCNC: 8 MMOL/L (CALC) (ref 7–17)
AST SERPL-CCNC: 25 U/L (ref 10–40)
BASOPHILS # BLD AUTO: 38 CELLS/UL (ref 0–200)
BASOPHILS NFR BLD AUTO: 0.6 %
BILIRUB SERPL-MCNC: 0.5 MG/DL (ref 0.2–1.2)
BUN SERPL-MCNC: 13 MG/DL (ref 7–25)
CALCIUM SERPL-MCNC: 9.2 MG/DL (ref 8.6–10.3)
CHLORIDE SERPL-SCNC: 103 MMOL/L (ref 98–110)
CHOLEST SERPL-MCNC: 241 MG/DL
CHOLEST/HDLC SERPL: 6.9 (CALC)
CO2 SERPL-SCNC: 28 MMOL/L (ref 20–32)
CREAT SERPL-MCNC: 1.01 MG/DL (ref 0.6–1.29)
EGFRCR SERPLBLD CKD-EPI 2021: 94 ML/MIN/1.73M2
EOSINOPHIL # BLD AUTO: 141 CELLS/UL (ref 15–500)
EOSINOPHIL NFR BLD AUTO: 2.2 %
ERYTHROCYTE [DISTWIDTH] IN BLOOD BY AUTOMATED COUNT: 13.5 % (ref 11–15)
EST. AVERAGE GLUCOSE BLD GHB EST-MCNC: 126 MG/DL
EST. AVERAGE GLUCOSE BLD GHB EST-SCNC: 7 MMOL/L
GLUCOSE SERPL-MCNC: 109 MG/DL (ref 65–99)
HBA1C MFR BLD: 6 %
HCT VFR BLD AUTO: 46.8 % (ref 38.5–50)
HDLC SERPL-MCNC: 35 MG/DL
HGB BLD-MCNC: 15 G/DL (ref 13.2–17.1)
LDLC SERPL CALC-MCNC: 164 MG/DL (CALC)
LYMPHOCYTES # BLD AUTO: 2022 CELLS/UL (ref 850–3900)
LYMPHOCYTES NFR BLD AUTO: 31.6 %
MCH RBC QN AUTO: 28.2 PG (ref 27–33)
MCHC RBC AUTO-ENTMCNC: 32.1 G/DL (ref 32–36)
MCV RBC AUTO: 88.1 FL (ref 80–100)
MONOCYTES # BLD AUTO: 614 CELLS/UL (ref 200–950)
MONOCYTES NFR BLD AUTO: 9.6 %
NEUTROPHILS # BLD AUTO: 3584 CELLS/UL (ref 1500–7800)
NEUTROPHILS NFR BLD AUTO: 56 %
NONHDLC SERPL-MCNC: 206 MG/DL (CALC)
PLATELET # BLD AUTO: 284 THOUSAND/UL (ref 140–400)
PMV BLD REES-ECKER: 10.5 FL (ref 7.5–12.5)
POTASSIUM SERPL-SCNC: 4.3 MMOL/L (ref 3.5–5.3)
PROT SERPL-MCNC: 6.8 G/DL (ref 6.1–8.1)
PSA SERPL-MCNC: 0.79 NG/ML
RBC # BLD AUTO: 5.31 MILLION/UL (ref 4.2–5.8)
SODIUM SERPL-SCNC: 139 MMOL/L (ref 135–146)
TRIGL SERPL-MCNC: 245 MG/DL
TSH SERPL-ACNC: 3.83 MIU/L (ref 0.4–4.5)
WBC # BLD AUTO: 6.4 THOUSAND/UL (ref 3.8–10.8)

## 2025-09-19 ENCOUNTER — APPOINTMENT (OUTPATIENT)
Dept: GASTROENTEROLOGY | Facility: EXTERNAL LOCATION | Age: 45
End: 2025-09-19
Payer: COMMERCIAL